# Patient Record
Sex: FEMALE | Race: WHITE | HISPANIC OR LATINO | URBAN - METROPOLITAN AREA
[De-identification: names, ages, dates, MRNs, and addresses within clinical notes are randomized per-mention and may not be internally consistent; named-entity substitution may affect disease eponyms.]

---

## 2020-01-18 ENCOUNTER — EMERGENCY (EMERGENCY)
Facility: HOSPITAL | Age: 40
LOS: 1 days | Discharge: HOME | End: 2020-01-18
Attending: INTERNAL MEDICINE | Admitting: INTERNAL MEDICINE
Payer: MEDICAID

## 2020-01-18 VITALS
DIASTOLIC BLOOD PRESSURE: 87 MMHG | OXYGEN SATURATION: 98 % | TEMPERATURE: 98 F | HEART RATE: 92 BPM | SYSTOLIC BLOOD PRESSURE: 139 MMHG | RESPIRATION RATE: 16 BRPM

## 2020-01-18 VITALS
HEART RATE: 86 BPM | TEMPERATURE: 98 F | SYSTOLIC BLOOD PRESSURE: 137 MMHG | OXYGEN SATURATION: 99 % | DIASTOLIC BLOOD PRESSURE: 86 MMHG | RESPIRATION RATE: 17 BRPM

## 2020-01-18 PROCEDURE — 99283 EMERGENCY DEPT VISIT LOW MDM: CPT

## 2020-01-18 RX ORDER — ONDANSETRON 8 MG/1
4 TABLET, FILM COATED ORAL ONCE
Refills: 0 | Status: COMPLETED | OUTPATIENT
Start: 2020-01-18 | End: 2020-01-18

## 2020-01-18 RX ORDER — LAMOTRIGINE 25 MG/1
200 TABLET, ORALLY DISINTEGRATING ORAL ONCE
Refills: 0 | Status: COMPLETED | OUTPATIENT
Start: 2020-01-18 | End: 2020-01-18

## 2020-01-18 RX ADMIN — LAMOTRIGINE 200 MILLIGRAM(S): 25 TABLET, ORALLY DISINTEGRATING ORAL at 19:46

## 2020-01-18 RX ADMIN — ONDANSETRON 4 MILLIGRAM(S): 8 TABLET, FILM COATED ORAL at 19:56

## 2020-01-18 NOTE — ED PROVIDER NOTE - PHYSICAL EXAMINATION
PHYSICAL EXAM:  GENERAL: NAD, well-groomed, well-developed  HEAD:  Atraumatic, Normocephalic  EYES: EOMI, PERRLA, conjunctiva and sclera clear  ENMT: No tonsillar erythema, exudates, or enlargement; Moist mucous membranes  NECK: Supple, No JVD, trachea midline, full ROM  HEART: Regular rate and rhythm; No murmurs, rubs, or gallops  RESPIRATORY: CTA B/L, No W/R/R  ABDOMEN: Soft, Nontender, Nondistended  BACK: no cva or midline ttp, normal ROM  NEURO: A&Ox3, nonfocal, moving all extremities, PERRL, normal speech/memory, 5/5 strength in extremities   EXTREMITIES:  2+ Peripheral Pulses, No clubbing, cyanosis, or edema  SKIN: warm, dry, normal color, no rash

## 2020-01-18 NOTE — ED PROVIDER NOTE - PATIENT PORTAL LINK FT
You can access the FollowMyHealth Patient Portal offered by [facility] by registering at the following website: [email or web address]. By joining [employer]’s FollowMyHealth portal, you will also be able to view your health information using other applications (apps) compatible with our system. You can access the FollowMyHealth Patient Portal offered by [facility] by registering at the following website: [email or web address]. By joining [employer]’s FollowMyHealth portal, you will also be able to view your health information using other applications (apps) compatible with our system.

## 2020-01-18 NOTE — ED PROVIDER NOTE - NSFOLLOWUPINSTRUCTIONS_ED_ALL_ED_FT
Take your Lamictal as prescribed.     Try and get good sleep.    Know that alcohol can lower your threshold for seizures.    Try and establish neurology follow up.    Return to ER for new or concerning symptoms.

## 2020-01-18 NOTE — ED ADULT TRIAGE NOTE - CHIEF COMPLAINT QUOTE
Pt states that she missed 2 days of her Lamictal, had a seizure today.  Past medical history seizures, last seizure 3 days ago, pt awake alert talking on cell phone in triage Pt states that she missed 2 days of her Lamictal, had a seizure today.  Past medical history seizures, last seizure 3 years ago, pt awake alert talking on cell phone in triage

## 2020-01-18 NOTE — ED PROVIDER NOTE - ATTENDING CONTRIBUTION TO CARE
DENTON Salas MD performed a history and physical exam of the patient and discussed their management with the resident and /or advanced care provider. I reviewed the resident and /or ACP's note and agree with the documented findings and plan of care. My medical decision making and observations are found above.    Awake, Alert, Conversant.  Resting comfortably.  Breath sounds clear in all lung fields.  Normal and regular heart rate without murmurs, rubs, or gallops.  Normal S1/S2.  Abdomen soft and nontender.  No lower extremity swelling or tenderness.  Alert and Oriented x3, Cn II-XII intact, finger to nose intact B/L, no pronator drift, conversant with fluent speech, moving all extremities with good strength, sensation intact throughout.

## 2020-01-18 NOTE — ED PROVIDER NOTE - NSFOLLOWUPCLINICS_GEN_ALL_ED_FT
[facility]  Neurology  [address] [place] [address] [address] [place] [address]  Phone: [phone]  Fax:   Follow Up Time: [facility]  Neurology  [address] [place] [id number]  Phone: [phone]  Fax:     [facility]  Neurology Epilepsy  [address] [place] [address], [address] [place] [id number]  Phone: [phone]  Fax: [phone]  Follow Up Time:

## 2020-01-18 NOTE — ED ADULT NURSE NOTE - OBJECTIVE STATEMENT
Pt received to room 20. Pt comes to ED s/p seizure. Pt has hx of seizure disorder, takes Lamictal 200mg PO but did not take it the past 2 days. Seizure was witnessed at home by pt's BP. Pt Slumped back on her couch and put up her hands as per boyfriend's report. Pt was post ictal afterwards. No longer follows up with a neurologist, gets RX from her PCP. Respirations are even & unlabored, denies chest pain, dyspnea, N/V/D, chills, fever, weakness, dizziness, headache, palpitations, cough. Pt is A&Ox4, ambulates independently.

## 2020-01-18 NOTE — ED PROVIDER NOTE - OBJECTIVE STATEMENT
39 yoF, PMHX of sz d/o on lamictal, 200 mg QD, BIB EMS s/p seizure at home witnessed by boyfriend. Bf states pt was zipping up her jacket when she went quiet and put up her hands. Slumped back on couch. Post ictal period after. Pt remembers waking up in ambulance. No significant trauma. Now back to baseline in ED. Missed dose of lamictal yesterday and drank etoh. Also increased life stressors. No sz since 2016. No longer follows with neurologist as they moved, PCP writes RX. No recent illness. No sx or complaints currently. Boyfriend here for safe discharge.

## 2020-01-18 NOTE — ED PROVIDER NOTE - CLINICAL SUMMARY MEDICAL DECISION MAKING FREE TEXT BOX
[place] PGY2- sz d/o for years, on Lamictal, missed dose, drank etoh, witnessed by bf, no trauma, no recent illness, back to baseline  sz 2/2 to med non compliance vs poor sleep/etoh  lamictal dose, urine/hcg/dip  d/c with neuro f/u [facility] PGY2- sz d/o for years, on Lamictal, missed dose, drank etoh, witnessed by bf, no trauma, no recent illness, back to baseline  sz 2/2 to med non compliance vs poor sleep/etoh  lamictal dose, urine/hcg/dip  d/c with neuro f/u    Dr. Salas: I agree with the above provided history and exam and addend/modify it as follows.  39F HX known seizure disorder on lamictal Presents with an observed seizure after missing a dose.  No head trauma.  No features different from her typical seizures.  Will evaluate for UTI and pregnancy.  Dose lamictal.  No evidence of systemic infection or electrolyte disturbance.  Stable for discharge with outpatient follow-up.

## 2020-01-18 NOTE — ED ADULT TRIAGE NOTE - MEANS OF ARRIVAL
A/P 31 y/o PMHx HIV (sexually contracted, dx 2007, last CD4 360s, VLUD as of May 2019 per patient, on Biktarvy), anxiety presenting with right lower extremity abscess near achilles tendon and right dorsum wound at 4th webspace    - Pt refusing dressing change at first. Was able to apply gauze and kerlix with ACE  - Wound care order submitted for dry sterile dressing changes daily  - C/w IV Abx as per primary team  - F/u abscess wound culture  - Weight bearing as tolerated   - Podiatry will sign off. Page as needed A/P 31 y/o PMHx HIV (sexually contracted, dx 2007, last CD4 360s, VLUD as of May 2019 per patient, on Biktarvy), anxiety presenting with right lower extremity abscess near achilles tendon and right dorsum wound at 4th webspace    - Pt refusing dressing change at first. Was able to apply gauze and kerlix with ACE  - Stable I+D sites. No clinical signs of infection  - Dry sterile dressing changes daily  - C/w IV Abx as per primary team  - F/u abscess wound culture  - Weight bearing as tolerated   Podiatry will follow stretcher

## 2020-01-18 NOTE — ED ADULT NURSE NOTE - CHIEF COMPLAINT QUOTE
Pt states that she missed 2 days of her Lamictal, had a seizure today.  Past medical history seizures, last seizure 3 years ago, pt awake alert talking on cell phone in triage
